# Patient Record
Sex: MALE | Race: WHITE | NOT HISPANIC OR LATINO | Employment: UNEMPLOYED | URBAN - METROPOLITAN AREA
[De-identification: names, ages, dates, MRNs, and addresses within clinical notes are randomized per-mention and may not be internally consistent; named-entity substitution may affect disease eponyms.]

---

## 2023-12-10 ENCOUNTER — HOSPITAL ENCOUNTER (EMERGENCY)
Facility: MEDICAL CENTER | Age: 43
End: 2023-12-10
Attending: EMERGENCY MEDICINE
Payer: COMMERCIAL

## 2023-12-10 VITALS
BODY MASS INDEX: 23.74 KG/M2 | SYSTOLIC BLOOD PRESSURE: 110 MMHG | TEMPERATURE: 98 F | DIASTOLIC BLOOD PRESSURE: 70 MMHG | WEIGHT: 184.97 LBS | RESPIRATION RATE: 18 BRPM | OXYGEN SATURATION: 94 % | HEART RATE: 88 BPM | HEIGHT: 74 IN

## 2023-12-10 DIAGNOSIS — F11.20 METHADONE DEPENDENCE (HCC): ICD-10-CM

## 2023-12-10 DIAGNOSIS — R56.9 SEIZURE-LIKE ACTIVITY (HCC): ICD-10-CM

## 2023-12-10 LAB
ALBUMIN SERPL BCP-MCNC: 4.4 G/DL (ref 3.2–4.9)
ALBUMIN/GLOB SERPL: 1.2 G/DL
ALP SERPL-CCNC: 97 U/L (ref 30–99)
ALT SERPL-CCNC: 22 U/L (ref 2–50)
ANION GAP SERPL CALC-SCNC: 13 MMOL/L (ref 7–16)
AST SERPL-CCNC: 24 U/L (ref 12–45)
BASOPHILS # BLD AUTO: 0.4 % (ref 0–1.8)
BASOPHILS # BLD: 0.03 K/UL (ref 0–0.12)
BILIRUB SERPL-MCNC: 0.8 MG/DL (ref 0.1–1.5)
BUN SERPL-MCNC: 12 MG/DL (ref 8–22)
CALCIUM ALBUM COR SERPL-MCNC: 9.1 MG/DL (ref 8.5–10.5)
CALCIUM SERPL-MCNC: 9.4 MG/DL (ref 8.5–10.5)
CHLORIDE SERPL-SCNC: 99 MMOL/L (ref 96–112)
CO2 SERPL-SCNC: 24 MMOL/L (ref 20–33)
CREAT SERPL-MCNC: 1.06 MG/DL (ref 0.5–1.4)
EKG IMPRESSION: NORMAL
EOSINOPHIL # BLD AUTO: 0.18 K/UL (ref 0–0.51)
EOSINOPHIL NFR BLD: 2.2 % (ref 0–6.9)
ERYTHROCYTE [DISTWIDTH] IN BLOOD BY AUTOMATED COUNT: 44.3 FL (ref 35.9–50)
GFR SERPLBLD CREATININE-BSD FMLA CKD-EPI: 90 ML/MIN/1.73 M 2
GLOBULIN SER CALC-MCNC: 3.7 G/DL (ref 1.9–3.5)
GLUCOSE SERPL-MCNC: 90 MG/DL (ref 65–99)
HCT VFR BLD AUTO: 45.9 % (ref 42–52)
HGB BLD-MCNC: 15.1 G/DL (ref 14–18)
IMM GRANULOCYTES # BLD AUTO: 0.02 K/UL (ref 0–0.11)
IMM GRANULOCYTES NFR BLD AUTO: 0.2 % (ref 0–0.9)
LYMPHOCYTES # BLD AUTO: 1.92 K/UL (ref 1–4.8)
LYMPHOCYTES NFR BLD: 23.1 % (ref 22–41)
MCH RBC QN AUTO: 30.1 PG (ref 27–33)
MCHC RBC AUTO-ENTMCNC: 32.9 G/DL (ref 32.3–36.5)
MCV RBC AUTO: 91.4 FL (ref 81.4–97.8)
MONOCYTES # BLD AUTO: 0.55 K/UL (ref 0–0.85)
MONOCYTES NFR BLD AUTO: 6.6 % (ref 0–13.4)
NEUTROPHILS # BLD AUTO: 5.6 K/UL (ref 1.82–7.42)
NEUTROPHILS NFR BLD: 67.5 % (ref 44–72)
NRBC # BLD AUTO: 0 K/UL
NRBC BLD-RTO: 0 /100 WBC (ref 0–0.2)
PLATELET # BLD AUTO: 277 K/UL (ref 164–446)
PMV BLD AUTO: 10.7 FL (ref 9–12.9)
POTASSIUM SERPL-SCNC: 4 MMOL/L (ref 3.6–5.5)
PROT SERPL-MCNC: 8.1 G/DL (ref 6–8.2)
RBC # BLD AUTO: 5.02 M/UL (ref 4.7–6.1)
SODIUM SERPL-SCNC: 136 MMOL/L (ref 135–145)
WBC # BLD AUTO: 8.3 K/UL (ref 4.8–10.8)

## 2023-12-10 PROCEDURE — 85025 COMPLETE CBC W/AUTO DIFF WBC: CPT

## 2023-12-10 PROCEDURE — 80053 COMPREHEN METABOLIC PANEL: CPT

## 2023-12-10 PROCEDURE — 700102 HCHG RX REV CODE 250 W/ 637 OVERRIDE(OP): Mod: UD | Performed by: EMERGENCY MEDICINE

## 2023-12-10 PROCEDURE — 99285 EMERGENCY DEPT VISIT HI MDM: CPT

## 2023-12-10 PROCEDURE — A9270 NON-COVERED ITEM OR SERVICE: HCPCS | Mod: UD | Performed by: EMERGENCY MEDICINE

## 2023-12-10 PROCEDURE — 700111 HCHG RX REV CODE 636 W/ 250 OVERRIDE (IP): Mod: UD | Performed by: EMERGENCY MEDICINE

## 2023-12-10 PROCEDURE — 36415 COLL VENOUS BLD VENIPUNCTURE: CPT

## 2023-12-10 PROCEDURE — 93005 ELECTROCARDIOGRAM TRACING: CPT | Performed by: EMERGENCY MEDICINE

## 2023-12-10 PROCEDURE — 96374 THER/PROPH/DIAG INJ IV PUSH: CPT

## 2023-12-10 RX ORDER — METHADONE HYDROCHLORIDE 10 MG/ML
50 CONCENTRATE ORAL ONCE
Status: COMPLETED | OUTPATIENT
Start: 2023-12-10 | End: 2023-12-10

## 2023-12-10 RX ORDER — LEVETIRACETAM 500 MG/5ML
30 INJECTION, SOLUTION, CONCENTRATE INTRAVENOUS ONCE
Status: COMPLETED | OUTPATIENT
Start: 2023-12-10 | End: 2023-12-10

## 2023-12-10 RX ORDER — LEVETIRACETAM 500 MG/1
500 TABLET ORAL 2 TIMES DAILY
Qty: 60 TABLET | Refills: 0 | Status: SHIPPED | OUTPATIENT
Start: 2023-12-10

## 2023-12-10 RX ADMIN — METHADONE HYDROCHLORIDE 50 MG: 10 CONCENTRATE ORAL at 08:22

## 2023-12-10 RX ADMIN — LEVETIRACETAM 2520 MG: 100 INJECTION, SOLUTION, CONCENTRATE INTRAVENOUS at 07:21

## 2023-12-10 NOTE — ED TRIAGE NOTES
Chief Complaint   Patient presents with    Seizure     Pt BIB EMS from the [facility] for a witnessed seizure lasting approximately 2 minutes. Pt hx of seizure when withdrawing from heroin use, ran out of methadone on 12/8. Pt A&Ox4 at this time.

## 2023-12-10 NOTE — DISCHARGE INSTRUCTIONS
You may have had a seizure today, you have been loaded with Keppra which should help suppress the seizures.  We have sent a prescription for this.  Your labs were all reassuring.

## 2023-12-10 NOTE — ED PROVIDER NOTES
ED Provider Note    CHIEF COMPLAINT  Chief Complaint   Patient presents with    Seizure     Pt BIB EMS from the alf for a witnessed seizure lasting approximately 2 minutes. Pt hx of seizure when withdrawing from heroin use, ran out of methadone on 12/8. Pt A&Ox4 at this time.              HPI/  OUTSIDE HISTORIAN(S):  EMS upon patient presentation, law enforcement    Teo Canela is a 43 y.o. male who presents with possible seizure.  There is reports of worsening history of methadone use.  He reports that when he goes through methadone withdrawal he has had seizures in the past.  Patient denies any associated polysubstance abuse otherwise.  He denies any alcohol abuse, methamphetamine abuse or benzodiazepine abuse.  Patient was recently incarcerated, last use of methadone was on the eighth, 2 days ago.  Patient reports he takes 130 mg of methadone daily.  Patient reports she does not want buprenorphine as he does not like the way it makes him feel.  Patient reports some mild body aches, and general discomfort.  Patient reports that he was in line at the alf, and does not recall anything until he was being sternal rubbed by staff.  Per EMS patient was postictal upon arrival and over time return to normal mental status.  Patient was caught by bystanders, he did not strike his head.    PAST MEDICAL HISTORY   has a past medical history of Backpain, Brain injury (HCC), Bulging disc (18 mos ago (MRI)), Depression (2007), Fall, and Psychiatric problem.    SURGICAL HISTORY   has a past surgical history that includes other orthopedic surgery (4/2010) and nerve median exploration.    FAMILY HISTORY  No family history on file.    SOCIAL HISTORY  Social History     Tobacco Use    Smoking status: Every Day    Smokeless tobacco: Not on file    Tobacco comments:     Hx 1/2 PPD, Quit 10/06/2010   Substance and Sexual Activity    Alcohol use: Yes     Comment: occasional    Drug use: No     Comment: marijuana    Sexual  "activity: Not on file       CURRENT MEDICATIONS  Home Medications       Reviewed by Iwona Cao R.N. (Registered Nurse) on 12/10/23 at 0557  Med List Status: Not Addressed     Medication Last Dose Status   acetaminophen (TYLENOL) 325 MG TABS  Active   ibuprofen (MOTRIN) 200 MG TABS  Active                    ALLERGIES  Allergies   Allergen Reactions    Nkda [No Known Drug Allergy]        PHYSICAL EXAM  VITAL SIGNS: /87   Pulse (!) 101   Temp 36.8 °C (98.3 °F) (Temporal)   Resp 18   Ht 1.88 m (6' 2\")   Wt 83.9 kg (184 lb 15.5 oz)   SpO2 96%   BMI 23.75 kg/m²    Physical Exam  Constitutional:       Appearance: Normal appearance.   HENT:      Head: Normocephalic.      Right Ear: Tympanic membrane normal.      Left Ear: Tympanic membrane normal.      Nose: Nose normal.      Mouth/Throat:      Mouth: Mucous membranes are moist.   Eyes:      Extraocular Movements: Extraocular movements intact.      Pupils: Pupils are equal, round, and reactive to light.   Cardiovascular:      Rate and Rhythm: Normal rate and regular rhythm.   Pulmonary:      Effort: Pulmonary effort is normal. No respiratory distress.      Breath sounds: Normal breath sounds. No stridor. No wheezing or rales.   Chest:      Chest wall: No tenderness.   Abdominal:      General: Abdomen is flat. There is no distension.      Palpations: Abdomen is soft. There is no mass.      Tenderness: There is no abdominal tenderness.   Musculoskeletal:      Cervical back: Normal range of motion.   Skin:     General: Skin is warm.      Capillary Refill: Capillary refill takes less than 2 seconds.   Neurological:      General: No focal deficit present.      Mental Status: He is alert and oriented to person, place, and time.      Comments: Distal and proximal strength 5/5 in upper and lower extremities. Normal gait. No dysmetria. No sensation deficits. No visual field deficits. Cranial nerves intact.        Psychiatric:         Mood and Affect: Mood normal. "           DIAGNOSTIC STUDIES / PROCEDURES  EKG  I have independently interpreted this EKG  See below    LABS  Results for orders placed or performed during the hospital encounter of 12/10/23   CBC WITH DIFFERENTIAL   Result Value Ref Range    WBC 8.3 4.8 - 10.8 K/uL    RBC 5.02 4.70 - 6.10 M/uL    Hemoglobin 15.1 14.0 - 18.0 g/dL    Hematocrit 45.9 42.0 - 52.0 %    MCV 91.4 81.4 - 97.8 fL    MCH 30.1 27.0 - 33.0 pg    MCHC 32.9 32.3 - 36.5 g/dL    RDW 44.3 35.9 - 50.0 fL    Platelet Count 277 164 - 446 K/uL    MPV 10.7 9.0 - 12.9 fL    Neutrophils-Polys 67.50 44.00 - 72.00 %    Lymphocytes 23.10 22.00 - 41.00 %    Monocytes 6.60 0.00 - 13.40 %    Eosinophils 2.20 0.00 - 6.90 %    Basophils 0.40 0.00 - 1.80 %    Immature Granulocytes 0.20 0.00 - 0.90 %    Nucleated RBC 0.00 0.00 - 0.20 /100 WBC    Neutrophils (Absolute) 5.60 1.82 - 7.42 K/uL    Lymphs (Absolute) 1.92 1.00 - 4.80 K/uL    Monos (Absolute) 0.55 0.00 - 0.85 K/uL    Eos (Absolute) 0.18 0.00 - 0.51 K/uL    Baso (Absolute) 0.03 0.00 - 0.12 K/uL    Immature Granulocytes (abs) 0.02 0.00 - 0.11 K/uL    NRBC (Absolute) 0.00 K/uL   CMP   Result Value Ref Range    Sodium 136 135 - 145 mmol/L    Potassium 4.0 3.6 - 5.5 mmol/L    Chloride 99 96 - 112 mmol/L    Co2 24 20 - 33 mmol/L    Anion Gap 13.0 7.0 - 16.0    Glucose 90 65 - 99 mg/dL    Bun 12 8 - 22 mg/dL    Creatinine 1.06 0.50 - 1.40 mg/dL    Calcium 9.4 8.5 - 10.5 mg/dL    Correct Calcium 9.1 8.5 - 10.5 mg/dL    AST(SGOT) 24 12 - 45 U/L    ALT(SGPT) 22 2 - 50 U/L    Alkaline Phosphatase 97 30 - 99 U/L    Total Bilirubin 0.8 0.1 - 1.5 mg/dL    Albumin 4.4 3.2 - 4.9 g/dL    Total Protein 8.1 6.0 - 8.2 g/dL    Globulin 3.7 (H) 1.9 - 3.5 g/dL    A-G Ratio 1.2 g/dL   ESTIMATED GFR   Result Value Ref Range    GFR (CKD-EPI) 90 >60 mL/min/1.73 m 2   EKG   Result Value Ref Range    Report       [facility] Emergency Dept.    Test Date:  2023-12-10  Pt Name:    GRACIA KRAUSE                 Department: ER  MRN:        [id number]                      Room:        06  Gender:     M                            Technician: [id number]  :        1980                   Requested By:CHARLES THOMPSON  Order #:    [id number]                    Reading MD: Carmelo Parrish MD    Measurements  Intervals                                Axis  Rate:       67                           P:          78  DE:         146                          QRS:        75  QRSD:       111                          T:          71  QT:         411  QTc:        434    Interpretive Statements  EKG is normal sinus rhythm, normal axis mild interventricular conduction  delay consistent with likely right fascicular block no ST changes consistent  with acute regional ischemia  Electronically Signed On 12- 07:49:48 PST by Carmelo Parrish MD           COURSE & MEDICAL DECISION MAKING        INITIAL ASSESSMENT, COURSE AND PLAN  Care Narrative: Well-appearing patient here with history of seizures.  Seizures occur when he is going through methadone withdrawal.  Patient likely with mild underlying epilepsy which is on masked with his withdrawal, he does not appear to be in benzodiazepine withdrawal, or has any findings consistent with alcohol withdrawal.  Patient with very reassuring neurologic exam.  Patient without any evidence of head trauma.  Given patient with longstanding history of seizures, I do believe that repeating head CT would be illustrated at this point and therefore this will be deferred.  Check basic labs for further restratification, will also check EKG as methadone is known to prolong the QTc and certainly his symptoms could have been secondary to cardiac syncope though my suspicion of this especially given the reported postictal phase is significantly lower.  EKG is unremarkable.  Patient basic labs are unremarkable.  Patient given 50 mg methadone here in the emergency department to help with his mild methadone withdrawal.   Patient loaded with Keppra given his history of prior seizure.  Will start him on Keppra and have him follow-up with neurology as an outpatient.        DISPOSITION AND DISCUSSIONS      Escalation of care considered, and ultimately not performed: Central neurologic lesion thought to be highly unlikely in this patient with reassuring neurologic exam, prior seizures in the past, therefore CT, MRI deferred    Barriers to care at this time, including but not limited to:  Patient currently incarcerated    FINAL DIAGNOSIS  1. Seizure-like activity (HCC)    2. Methadone dependence (HCC)

## 2023-12-10 NOTE — ED NOTES
Pt resting in bed, seizure precautions in place for patient safety. Even and unlabored respirations.   Officer at bedside.

## 2023-12-10 NOTE — ED NOTES
D[facility] instructions provided to patient and officer. Pt escorted off out of department with steady gait.